# Patient Record
(demographics unavailable — no encounter records)

---

## 2024-11-22 NOTE — PHYSICAL EXAM
[No Acute Distress] : no acute distress [Normal Oropharynx] : normal oropharynx [I] : Mallampati Class: I [Normal Appearance] : normal appearance [Supple] : supple [No Neck Mass] : no neck mass [No JVD] : no jvd [Normal Rate/Rhythm] : normal rate/rhythm [Normal S1, S2] : normal s1, s2 [No Murmurs] : no murmurs [No Resp Distress] : no resp distress [Benign] : benign [Not Tender] : not tender [No Masses] : no masses [Normal Bowel Sounds] : normal bowel sounds [No Clubbing] : no clubbing [No Edema] : no edema [Oriented x3] : oriented x3 [Normal Affect] : normal affect [TextBox_68] : rhonchi at right base ?new
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
## 2024-11-22 NOTE — PROCEDURE
[FreeTextEntry1] : PFT  demonstrates   moderate  obstructive ventilatory defect with diminished FEV1.   The FEV1/FVC ratio is diminished at 54  percent.  There was no positive bronchodilator response  There is possible mild  restriction demonstrated by diminished FVC The DLCO is diminished   DLCO/VA is normal  Newton Hercules MD

## 2024-11-22 NOTE — DISCUSSION/SUMMARY
[FreeTextEntry1] : 79 year old woman  COPD, obstruction on prior PFT  chronic left base fibrotic changes patient  She is using  Spiriva daily  and  Symbicort 160/4.5 2 puffs twice per day.  Improved on this regimen  She does not use Symbicort regularly, still feels well  She has had no exacerbation.   albuterol MDI as needed. No exacerbations  HTN, on amlodipine and lisinopril, being monitored  CT chest 7/3/23 demonstrated calcified granuloma, micronodules 5 mm right lung non calcified  nodule liver cysts    Repeat CT chest in  01/05/2024 was unchanged.5 mm subpleural nodule  1year follow up recommended   The order has been placed. .  Pt is agreeable.    Using Symbicort not regularly, using Spiriva daily  Advised to use Symbicort 2 p 1-2 times per day along with Spiriva   Despite prior PFT showing moderate  obstructive airway disease , she is breathing well  She has taken COVID vaccine   She states that she had pneumonia vaccine but does not recall when, likely 1-2 years ago   She receives influenza vaccine annually  Total time spent : 30 minutes Including: Preparation prior to visit - Reviewing prior record, results of tests and Consultation Reports as applicable Conducting an appropriate H & P during today's encounter Appropriate orders for tests, medications and procedures, as applicable Counseling patient  Note completion   Newton Hercules MD

## 2024-11-22 NOTE — REASON FOR VISIT
[Follow-Up] : a follow-up visit [Shortness of Breath] : shortness of breath [TextBox_44] : obstructive airway disease
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
## 2025-03-27 NOTE — DISCUSSION/SUMMARY
[FreeTextEntry1] : 80 year old woman  COPD, obstruction on prior PFT  chronic left base fibrotic changes patient  She is using  Spiriva daily  and  Symbicort 160/4.5 2 puffs twice per day.  Improved on this regimen  She does not use Symbicort regularly, still feels well  She has had no exacerbation.   albuterol MDI as needed. No exacerbations  HTN, on amlodipine and lisinopril, being monitored  CT chest 7/3/23 demonstrated calcified granuloma, micronodules 5 mm right lung non calcified  nodule liver cysts    Repeat CT chest in  01/05/2024 was unchanged.5 mm subpleural nodule  1year follow up recommended   The order has been placed. .  Pt is agreeable.   Pt  did not undergo yet  i will reorder  If without suspicious nodules then I will monitor with CT chest as needed   The order has been placed.  Pt is agreeable.    Using Symbicort not regularly, using Spiriva daily  Advised to use Symbicort 2 p 1-2 times per day along with Spiriva   Despite prior PFT showing moderate  obstructive airway disease , she is breathing well  She has taken COVID vaccine   She states that she had pneumonia vaccine but does not recall when, likely 1-2 years ago   She receives influenza vaccine annually  Total time spent : 30 minutes Including: Preparation prior to visit - Reviewing prior record, results of tests and Consultation Reports as applicable Conducting an appropriate H & P during today's encounter Appropriate orders for tests, medications and procedures, as applicable Counseling patient  Note completion   Newton Hercules MD
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
